# Patient Record
Sex: FEMALE | Race: BLACK OR AFRICAN AMERICAN
[De-identification: names, ages, dates, MRNs, and addresses within clinical notes are randomized per-mention and may not be internally consistent; named-entity substitution may affect disease eponyms.]

---

## 2019-08-27 ENCOUNTER — HOSPITAL ENCOUNTER (OUTPATIENT)
Dept: HOSPITAL 61 - KCIC MRI | Age: 49
Discharge: HOME | End: 2019-08-27
Attending: ORTHOPAEDIC SURGERY
Payer: MEDICAID

## 2019-08-27 DIAGNOSIS — M25.511: Primary | ICD-10-CM

## 2019-08-27 DIAGNOSIS — Z90.710: ICD-10-CM

## 2019-08-27 DIAGNOSIS — Z98.890: ICD-10-CM

## 2019-08-27 PROCEDURE — 73221 MRI JOINT UPR EXTREM W/O DYE: CPT

## 2019-08-27 NOTE — [facility]
MR of the right shoulder

 

HISTORY: Right shoulder pain. Previous surgery 2018.

 

TECHNIQUE: Routine multiplanar sequences are obtained.

 

FINDINGS:

Mild-to-moderate motion degradation.

 

Acromioclavicular joint is mildly degenerative.

 

There are postsurgical changes compatible with rotator cuff repair, with 

anchor screws at the humeral head and greater tuberosity. Mild 

heterogeneity of the rotator cuff but no evidence of significant recurrent

rupture or retraction. No significant subdeltoid bursal effusion.

 

No significant glenohumeral joint effusion. Mild signal within the 

posterior labrum, but no definite surface tear. Biceps tendon is intact.

 

No bone destruction or acute fracture. No acute soft tissue abnormality.

 

IMPRESSION:

1. Postsurgical changes of rotator cuff without evidence of significant 

recurrent tear.

2. Mild signal within the posterior labrum likely degenerative. No 

evidence of labral separation.

 

Electronically signed by: Htiesh Garcia MD (8/27/2019 2:10 PM) [id number]